# Patient Record
Sex: FEMALE | Race: WHITE
[De-identification: names, ages, dates, MRNs, and addresses within clinical notes are randomized per-mention and may not be internally consistent; named-entity substitution may affect disease eponyms.]

---

## 2019-10-01 ENCOUNTER — HOSPITAL ENCOUNTER (EMERGENCY)
Dept: HOSPITAL 61 - ER | Age: 54
Discharge: HOME | End: 2019-10-01
Payer: COMMERCIAL

## 2019-10-01 VITALS — BODY MASS INDEX: 34.95 KG/M2 | WEIGHT: 212.31 LBS | HEIGHT: 65.5 IN

## 2019-10-01 VITALS — SYSTOLIC BLOOD PRESSURE: 136 MMHG | DIASTOLIC BLOOD PRESSURE: 79 MMHG

## 2019-10-01 DIAGNOSIS — Z90.49: ICD-10-CM

## 2019-10-01 DIAGNOSIS — R11.2: ICD-10-CM

## 2019-10-01 DIAGNOSIS — I10: ICD-10-CM

## 2019-10-01 DIAGNOSIS — E03.9: ICD-10-CM

## 2019-10-01 DIAGNOSIS — Z98.890: ICD-10-CM

## 2019-10-01 DIAGNOSIS — N30.01: ICD-10-CM

## 2019-10-01 DIAGNOSIS — K44.9: Primary | ICD-10-CM

## 2019-10-01 DIAGNOSIS — N93.9: ICD-10-CM

## 2019-10-01 LAB
ALBUMIN SERPL-MCNC: 3.4 G/DL (ref 3.4–5)
ALBUMIN/GLOB SERPL: 0.9 {RATIO} (ref 1–1.7)
ALP SERPL-CCNC: 89 U/L (ref 46–116)
ALT SERPL-CCNC: 23 U/L (ref 14–59)
ANION GAP SERPL CALC-SCNC: 12 MMOL/L (ref 6–14)
APTT PPP: (no result) S
AST SERPL-CCNC: 18 U/L (ref 15–37)
BACTERIA #/AREA URNS HPF: (no result) /HPF
BASOPHILS # BLD AUTO: 0.1 X10^3/UL (ref 0–0.2)
BASOPHILS NFR BLD: 1 % (ref 0–3)
BILIRUB SERPL-MCNC: 0.4 MG/DL (ref 0.2–1)
BILIRUB UR QL STRIP: (no result)
BUN SERPL-MCNC: 12 MG/DL (ref 7–20)
BUN/CREAT SERPL: 13 (ref 6–20)
CALCIUM SERPL-MCNC: 9.3 MG/DL (ref 8.5–10.1)
CHLORIDE SERPL-SCNC: 107 MMOL/L (ref 98–107)
CO2 SERPL-SCNC: 25 MMOL/L (ref 21–32)
CREAT SERPL-MCNC: 0.9 MG/DL (ref 0.6–1)
EOSINOPHIL NFR BLD: 0.1 X10^3/UL (ref 0–0.7)
EOSINOPHIL NFR BLD: 2 % (ref 0–3)
ERYTHROCYTE [DISTWIDTH] IN BLOOD BY AUTOMATED COUNT: 12.9 % (ref 11.5–14.5)
FIBRINOGEN PPP-MCNC: (no result) MG/DL
GFR SERPLBLD BASED ON 1.73 SQ M-ARVRAT: 64.8 ML/MIN
GLOBULIN SER-MCNC: 3.6 G/DL (ref 2.2–3.8)
GLUCOSE SERPL-MCNC: 96 MG/DL (ref 70–99)
HCT VFR BLD CALC: 38.2 % (ref 36–47)
HGB BLD-MCNC: 13.7 G/DL (ref 12–15.5)
LIPASE: 103 U/L (ref 73–393)
LYMPHOCYTES # BLD: 2 X10^3/UL (ref 1–4.8)
LYMPHOCYTES NFR BLD AUTO: 34 % (ref 24–48)
MCH RBC QN AUTO: 32 PG (ref 25–35)
MCHC RBC AUTO-ENTMCNC: 36 G/DL (ref 31–37)
MCV RBC AUTO: 90 FL (ref 79–100)
MONO #: 0.4 X10^3/UL (ref 0–1.1)
MONOCYTES NFR BLD: 6 % (ref 0–9)
NEUT #: 3.5 X10^3/UL (ref 1.8–7.7)
NEUTROPHILS NFR BLD AUTO: 57 % (ref 31–73)
NITRITE UR QL STRIP: NEGATIVE
PH UR STRIP: 5.5 [PH]
PLATELET # BLD AUTO: 357 X10^3/UL (ref 140–400)
POTASSIUM SERPL-SCNC: 3.7 MMOL/L (ref 3.5–5.1)
PROT SERPL-MCNC: 7 G/DL (ref 6.4–8.2)
PROT UR STRIP-MCNC: 30 MG/DL
RBC # BLD AUTO: 4.22 X10^6/UL (ref 3.5–5.4)
RBC #/AREA URNS HPF: >40 /HPF (ref 0–2)
SODIUM SERPL-SCNC: 144 MMOL/L (ref 136–145)
SQUAMOUS #/AREA URNS LPF: (no result) /LPF
UROBILINOGEN UR-MCNC: 1 MG/DL
WBC # BLD AUTO: 6.1 X10^3/UL (ref 4–11)
WBC #/AREA URNS HPF: 0 /HPF (ref 0–4)

## 2019-10-01 PROCEDURE — 83690 ASSAY OF LIPASE: CPT

## 2019-10-01 PROCEDURE — 76856 US EXAM PELVIC COMPLETE: CPT

## 2019-10-01 PROCEDURE — 36415 COLL VENOUS BLD VENIPUNCTURE: CPT

## 2019-10-01 PROCEDURE — 87086 URINE CULTURE/COLONY COUNT: CPT

## 2019-10-01 PROCEDURE — 96374 THER/PROPH/DIAG INJ IV PUSH: CPT

## 2019-10-01 PROCEDURE — 80053 COMPREHEN METABOLIC PANEL: CPT

## 2019-10-01 PROCEDURE — 76830 TRANSVAGINAL US NON-OB: CPT

## 2019-10-01 PROCEDURE — 74177 CT ABD & PELVIS W/CONTRAST: CPT

## 2019-10-01 PROCEDURE — 99285 EMERGENCY DEPT VISIT HI MDM: CPT

## 2019-10-01 PROCEDURE — 96361 HYDRATE IV INFUSION ADD-ON: CPT

## 2019-10-01 PROCEDURE — 81001 URINALYSIS AUTO W/SCOPE: CPT

## 2019-10-01 PROCEDURE — 81025 URINE PREGNANCY TEST: CPT

## 2019-10-01 PROCEDURE — 85025 COMPLETE CBC W/AUTO DIFF WBC: CPT

## 2019-10-01 NOTE — RAD
Examination: CT ABD PELV W/ IV CONTRST ONLY



History: Epigastric abdominal pain



Comparison/Correlation: None



Findings: Axial images of the abdomen and pelvis were obtained following IV

contrast. Sagittal and coronal reformatted images were provided. Visualized

lung bases are clear. Small hiatal hernia is present. Cholecystectomy noted.

Fatty infiltration of liver is present. Spleen is normal. Adrenal glands and

kidneys are unremarkable. No hydronephrosis.



Circumferential thickening of left mid abdominal and upper pelvic small bowel

loops is noted without surrounding stranding. A very small amount of

intraperitoneal pelvic free fluid is present. No enlarged abdominal or pelvic

lymph nodes. Diverticulosis of the colon is present without acute

inflammation. No extraluminal gas. No inflammatory change about the cecum. No

significant atherosclerotic involvement of the abdominal aorta or iliac

arteries.



A small umbilical is present. Uterus is unremarkable. Bony structures are

unremarkable for the patient's age.





Impression:

Small hiatal hernia.



Circumferential wall thickening of multiple small bowel loops involving the

left abdomen and pelvis. Underlying inflammatory process is questioned

considering that small amount of intraperitoneal pelvic free fluid is present.





PQRS Compliance Statement:



One or more of the following individualized dose reduction techniques were

utilized for this examination:  

1. Automated exposure control  

2. Adjustment of the mA and/or kV according to patient size  

3. Use of iterative reconstruction technique

## 2019-10-01 NOTE — PHYS DOC
Past Medical History


Past Medical History:  Depression, Hypertension, Hypothyroid


Past Surgical History:  Cholecystectomy, 


Alcohol Use:  Occasionally


Drug Use:  None





Adult General


Chief Complaint


Chief Complaint:  ABDOMINAL PAIN





HPI


HPI





Patient is a 54  year old female that presents with abdominal pain that started 

on Saturday. He states that she was disconjugate this weekend started having 

crampy abdominal pain on Friday night/Saturday morning. She states that the 

abdominal pain is crampy in the middle of her stomach at that time that she was 

nauseous and unable to keep food down. Patient also had loss of appetite the 

patient states that she was using ginger ale and crackers. The patient states 

she's been having associated symptoms of nausea, vomiting, loss of appetite. 

States the pain has been intermittent in nature and that it disappeared and came

back on  and Monday she drove back from [place]. She stated that this 

morning she started having some vaginal bleeding as well. The patient states 

that she's not had a period in 2 years. The patient states her pain is 0 out of 

10 at the moment but when she does have pain as crampy and severe.





Review of Systems


Review of Systems





Constitutional: Denies fever or chills []


Eyes: Denies change in visual acuity, redness, or eye pain []


HENT: Denies nasal congestion or sore throat []


Respiratory: Denies cough or shortness of breath []


Cardiovascular: No additional information not addressed in HPI []


GI: Reports abdominal pain, nausea, vomiting Denies diarrhea []


: Reports vaginal bleeding.


Musculoskeletal: Denies back pain or joint pain []


Integument: Denies rash or skin lesions []


Neurologic: Denies headache, focal weakness or sensory changes []


Endocrine: Denies polyuria or polydipsia []





Complete systems were reviewed and found to be within normal limits, except as d

ocumented in this note.





Current Medications


Current Medications





Current Medications








 Medications


  (Trade)  Dose


 Ordered  Sig/Zhou  Start Time


 Stop Time Status Last Admin


Dose Admin


 


 Iohexol


  (Omnipaque 300


 Mg/ml)  75 ml  1X  ONCE  10/1/19 10:45


 10/1/19 10:46 DC 10/1/19 10:51


75 ML


 


 Ondansetron HCl


  (Zofran)  4 mg  1X  ONCE  10/1/19 10:15


 10/1/19 10:16 DC 10/1/19 10:44


4 MG


 


 Sodium Chloride  1,000 ml @ 


 1,000 mls/hr  1X  ONCE  10/1/19 11:30


 10/1/19 12:29 DC 10/1/19 11:36


1,000 MLS/HR











Allergies


Allergies





Allergies








Coded Allergies Type Severity Reaction Last Updated Verified


 


  No Known Drug Allergies    10/1/19 No











Physical Exam


Physical Exam





Constitutional: Well developed, well nourished, no acute distress, non-toxic 

appearance. []


HENT: Normocephalic, atraumatic, bilateral external ears normal, oropharynx 

moist, no oral exudates, nose normal. []


Eyes: PERRLA, EOMI, conjunctiva normal, no discharge. [] 


Neck: Normal range of motion, no tenderness, supple, no stridor. [] 


Cardiovascular:Heart rate regular rhythm, no murmur []


Lungs & Thorax:  Bilateral breath sounds clear to auscultation []


Abdomen: Bowel sounds hypoactive, soft, LUQ tenderness, no masses, no pulsatile 

masses. [] 


Skin: Warm, dry, no erythema, no rash. [] 


Back: No tenderness, no CVA tenderness. [] 


Extremities: No tenderness, no cyanosis, no clubbing, ROM intact, no edema. [] 


Neurologic: Alert and oriented X 3, normal motor function, normal sensory 

function, no focal deficits noted. []


Psychologic: Affect normal, judgement normal, mood normal. []





Current Patient Data


Vital Signs





                                   Vital Signs








  Date Time  Temp Pulse Resp B/P (MAP) Pulse Ox O2 Delivery O2 Flow Rate FiO2


 


10/1/19 13:33  69 18 136/79 (98) 97 Room Air  


 


10/1/19 09:54 98.6       





 98.6       








Lab Values





                                Laboratory Tests








Test


 10/1/19


09:51 10/1/19


10:00 10/1/19


10:20


 


POC Urine HCG, Qualitative


 Hcg negative


(Negative) 


 





 


Urine Collection Type  Unknown   


 


Urine Color  Red   


 


Urine Clarity  Cloudy   


 


Urine pH  5.5   


 


Urine Specific Gravity  >=1.030   


 


Urine Protein


 


 30 mg/dL


(NEG-TRACE) 





 


Urine Glucose (UA)


 


 Negative mg/dL


(NEG) 





 


Urine Ketones (Stick)


 


 Trace mg/dL


(NEG) 





 


Urine Blood  Large (NEG)   


 


Urine Nitrite


 


 Negative (NEG)


 





 


Urine Bilirubin


 


 Moderate (NEG)


 





 


Urine Urobilinogen Dipstick


 


 1.0 mg/dL (0.2


mg/dL) 





 


Urine Leukocyte Esterase  Small (NEG)   


 


Urine RBC


 


 >40 /HPF (0-2)


 





 


Urine WBC  0 /HPF (0-4)   


 


Urine Squamous Epithelial


Cells 


 Mod /LPF  


 





 


Urine Bacteria


 


 Few /HPF


(0-FEW) 





 


White Blood Count


 


 


 6.1 x10^3/uL


(4.0-11.0)


 


Red Blood Count


 


 


 4.22 x10^6/uL


(3.50-5.40)


 


Hemoglobin


 


 


 13.7 g/dL


(12.0-15.5)


 


Hematocrit


 


 


 38.2 %


(36.0-47.0)


 


Mean Corpuscular Volume


 


 


 90 fL ()





 


Mean Corpuscular Hemoglobin   32 pg (25-35)  


 


Mean Corpuscular Hemoglobin


Concent 


 


 36 g/dL


(31-37)


 


Red Cell Distribution Width


 


 


 12.9 %


(11.5-14.5)


 


Platelet Count


 


 


 357 x10^3/uL


(140-400)


 


Neutrophils (%) (Auto)   57 % (31-73)  


 


Lymphocytes (%) (Auto)   34 % (24-48)  


 


Monocytes (%) (Auto)   6 % (0-9)  


 


Eosinophils (%) (Auto)   2 % (0-3)  


 


Basophils (%) (Auto)   1 % (0-3)  


 


Neutrophils # (Auto)


 


 


 3.5 x10^3/uL


(1.8-7.7)


 


Lymphocytes # (Auto)


 


 


 2.0 x10^3/uL


(1.0-4.8)


 


Monocytes # (Auto)


 


 


 0.4 x10^3/uL


(0.0-1.1)


 


Eosinophils # (Auto)


 


 


 0.1 x10^3/uL


(0.0-0.7)


 


Basophils # (Auto)


 


 


 0.1 x10^3/uL


(0.0-0.2)


 


Sodium Level


 


 


 144 mmol/L


(136-145)


 


Potassium Level


 


 


 3.7 mmol/L


(3.5-5.1)


 


Chloride Level


 


 


 107 mmol/L


()


 


Carbon Dioxide Level


 


 


 25 mmol/L


(21-32)


 


Anion Gap   12 (6-14)  


 


Blood Urea Nitrogen


 


 


 12 mg/dL


(7-20)


 


Creatinine


 


 


 0.9 mg/dL


(0.6-1.0)


 


Estimated GFR


(Cockcroft-Gault) 


 


 64.8  





 


BUN/Creatinine Ratio   13 (6-20)  


 


Glucose Level


 


 


 96 mg/dL


(70-99)


 


Calcium Level


 


 


 9.3 mg/dL


(8.5-10.1)


 


Total Bilirubin


 


 


 0.4 mg/dL


(0.2-1.0)


 


Aspartate Amino Transferase


(AST) 


 


 18 U/L (15-37)





 


Alanine Aminotransferase (ALT)


 


 


 23 U/L (14-59)





 


Alkaline Phosphatase


 


 


 89 U/L


()


 


Total Protein


 


 


 7.0 g/dL


(6.4-8.2)


 


Albumin


 


 


 3.4 g/dL


(3.4-5.0)


 


Albumin/Globulin Ratio


 


 


 0.9 (1.0-1.7)


L


 


Lipase


 


 


 103 U/L


()





                                Laboratory Tests


10/1/19 10:20








                                Laboratory Tests


10/1/19 10:20














EKG


EKG


[]





Radiology/Procedures


Radiology/Procedures





                            [address] [id number] [phone]


                                        


                                 IMAGING REPORT





                                     Signed





PATIENT: NEDA SIMPSON    ACCOUNT: [id number]     MRN#: [id number]


: 1965           LOCATION: ER              AGE: 54


SEX: F                    EXAM DT: 10/01/19         ACCESSION#: [id number]


STATUS: REG ER            ORD. PHYSICIAN: KATHERINE PARSON


REASON: vaginal bleeding


PROCEDURE: PELVIS W/TV





EXAM: Pelvic sonogram.


 


HISTORY: Vaginal bleeding.


 


TECHNIQUE: Transabdominal and transvaginal sonographic imaging of the 


pelvis was performed.


 


COMPARISON: CT dated 10/1/2019.


 


FINDINGS: The uterus measures 12.1 x 7.0 x 4.8 cm. The endometrial stripe 


measures 5.6 mm in thickness. The ovaries are normal in size and 


demonstrate normal blood flow. There are nabothian cysts within the 


cervix. There is no free fluid.


 


IMPRESSION:


1. Prominent uterine size. No uterine mass such as a fibroid is seen 


sonographically.


2. Upper limits of normal endometrial stripe thickness for the 


postmenopausal status of the patient.


3. Nabothian cysts within the cervix.


 


Electronically signed by: Sheridan Basilio MD (10/1/2019 1:55 PM) [facility]














DICTATED and SIGNED BY:     SHERIDAN BASILIO MD


DATE:     10/01/19 1355

















[][address] [address] [phone]


                                        


                                 IMAGING REPORT





                                     Signed





PATIENT: NEDA SIMPSON    ACCOUNT: [id number]     MRN#: [id number]


: 1965           LOCATION: ER              AGE: 54


SEX: F                    EXAM DT: 10/01/19         ACCESSION#: [id number]


STATUS: REG ER            ORD. PHYSICIAN: KATHERINE PARSON


REASON: abd pain


PROCEDURE: CT ABD PELV W/ IV CONTRST ONLY





Examination: CT ABD PELV W/ IV CONTRST ONLY





History: Epigastric abdominal pain





Comparison/Correlation: None





Findings: Axial images of the abdomen and pelvis were obtained following IV


contrast. Sagittal and coronal reformatted images were provided. Visualized


lung bases are clear. Small hiatal hernia is present. Cholecystectomy noted.


Fatty infiltration of liver is present. Spleen is normal. Adrenal glands and


kidneys are unremarkable. No hydronephrosis.





Circumferential thickening of left mid abdominal and upper pelvic small bowel


loops is noted without surrounding stranding. A very small amount of


intraperitoneal pelvic free fluid is present. No enlarged abdominal or pelvic


lymph nodes. Diverticulosis of the colon is present without acute


inflammation. No extraluminal gas. No inflammatory change about the cecum. No


significant atherosclerotic involvement of the abdominal aorta or iliac


arteries.





A small umbilical is present. Uterus is unremarkable. Bony structures are


unremarkable for the patient's age.








Impression:


Small hiatal hernia.





Circumferential wall thickening of multiple small bowel loops involving the


left abdomen and pelvis. Underlying inflammatory process is questioned


considering that small amount of intraperitoneal pelvic free fluid is present.








PQRS Compliance Statement:





One or more of the following individualized dose reduction techniques were


utilized for this examination:  


1. Automated exposure control  


2. Adjustment of the mA and/or kV according to patient size  


3. Use of iterative reconstruction technique

















DICTATED and SIGNED BY:     ALESSANDRO NIETO MD


DATE:     10/01/19 2116





Course & Med Decision Making


Course & Med Decision Making


Pertinent Labs and Imaging studies reviewed. (See chart for details)





Will obtain labs, ua, CT scan, ultrasound. Will give supportive care.





UA showed patient was dehydrated and has small UTI, imaging was unremarkable for

 acute conditions. Will d/c home with Keflex, and Zofran and have follow up with

 OB and primary care.





Dragon Disclaimer


Dragon Disclaimer


This electronic medical record was generated, in whole or in part, using a voice

 recognition dictation system.





Departure


Departure


Impression:  


   Primary Impression:  


   Hiatal hernia


   Additional Impressions:  


   Abdominal pain


   Vaginal bleeding


   Urinary tract infection


Disposition:  01 HOME, SELF-CARE


Condition:  STABLE


Referrals:  


YAO FREDERICK (PCP)








CRIS RYAN MD


Patient Instructions:  Hernia, Urinary Tract Infection





Additional Instructions:  


Thank you for visiting [facility]. We appreciate you trusting us 

with your care. If any additional problems come up don't hesitate to return to 

visit us. Please follow up with your primary care provider so they can plan 

additional care if needed and know about the problem that you had. If symptoms 

worsen come back to the Emergency Department. Any concerning symptoms that start

 such as chest pain, shortness of air, weakness or numbness on one side of the 

body, running high fevers or any other concerning symptoms return to the ER.





Please fill your medications at any pharmacy and follow the prescription 

instructions.








You have been prescribed an antibiotic today to help fight your infection. 

Please take all of the antibiotic as directed. If after 48 hours the infection 

is not improving, please return for more care. If the infection worsens, return 

to ER for additional care.


Scripts


Ondansetron (ONDANSETRON ODT) 4 Mg Tab.rapdis


1 TAB PO PRN Q6-8HRS, #16 TAB


   Prov: KATHERINE PARSON         10/1/19 


Cephalexin (KEFLEX) 500 Mg Capsule


1 CAP PO BID for 7 Days, #14 CAP


   Prov: KATHERINE PARSON         10/1/19





Problem Qualifiers








   Additional Impressions:  


   Abdominal pain


   Abdominal location:  generalized  Qualified Codes:  R10.84 - Generalized 

   abdominal pain


   Urinary tract infection


   Urinary tract infection type:  acute cystitis  Hematuria presence:  with 

   hematuria  Qualified Codes:  N30.01 - Acute cystitis with hematuria








KATHERINE PARSON           Oct 1, 2019 10:18

## 2019-10-01 NOTE — RAD
EXAM: Pelvic sonogram.

 

HISTORY: Vaginal bleeding.

 

TECHNIQUE: Transabdominal and transvaginal sonographic imaging of the 

pelvis was performed.

 

COMPARISON: CT dated 10/1/2019.

 

FINDINGS: The uterus measures 12.1 x 7.0 x 4.8 cm. The endometrial stripe 

measures 5.6 mm in thickness. The ovaries are normal in size and 

demonstrate normal blood flow. There are nabothian cysts within the 

cervix. There is no free fluid.

 

IMPRESSION:

1. Prominent uterine size. No uterine mass such as a fibroid is seen 

sonographically.

2. Upper limits of normal endometrial stripe thickness for the 

postmenopausal status of the patient.

3. Nabothian cysts within the cervix.

 

Electronically signed by: Sheridan Hill MD (10/1/2019 1:55 PM) [place] [id number]